# Patient Record
Sex: MALE | Race: BLACK OR AFRICAN AMERICAN | NOT HISPANIC OR LATINO | URBAN - METROPOLITAN AREA
[De-identification: names, ages, dates, MRNs, and addresses within clinical notes are randomized per-mention and may not be internally consistent; named-entity substitution may affect disease eponyms.]

---

## 2023-10-04 ENCOUNTER — TELEPHONE (OUTPATIENT)
Dept: TRANSPLANT | Facility: HOSPITAL | Age: 48
End: 2023-10-04

## 2023-10-10 ENCOUNTER — DOCUMENTATION (OUTPATIENT)
Dept: TRANSPLANT | Facility: HOSPITAL | Age: 48
End: 2023-10-10

## 2023-10-10 NOTE — PROGRESS NOTES
Per prior note:  Patient has completed CT AP (in [facility]) and completed echo (in [facility]). CT AP will need to be reviewed to determine vessel suitability. Noted in HIE he did not complete stress test due to claustrophobia. Will bring in for updates with provider and SW -- will need to determine cardiac work up plan (stress, CT CS). Patient needs scope, referral placed. Attempted to reach patient, but his VM box was not set up.    Plan will be to call pt back to discuss.

## 2023-12-28 ENCOUNTER — DOCUMENTATION (OUTPATIENT)
Dept: TRANSPLANT | Facility: HOSPITAL | Age: 48
End: 2023-12-28
Payer: COMMERCIAL

## 2023-12-28 NOTE — PROGRESS NOTES
Attempted to call patient with phone number is [facility].  Number out of service.  Spoke with his dialysis unit who gave me another number/.  This number was also inactive.  Will send a no contact letter to patient.

## 2024-01-26 ENCOUNTER — DOCUMENTATION (OUTPATIENT)
Dept: TRANSPLANT | Facility: HOSPITAL | Age: 49
End: 2024-01-26
Payer: COMMERCIAL

## 2024-01-26 DIAGNOSIS — Z01.818 PRE-TRANSPLANT EVALUATION FOR KIDNEY TRANSPLANT: ICD-10-CM

## 2024-01-26 NOTE — PROGRESS NOTES
Spoke with patient.  Patient is currently in evaluation for kidney transplant and needs to come in for updates.  Patient verbalized understanding.  Echo, CT Cardiac Score, Labs, Surgery, and SW tasked out to MARYANNE Sandy to schedule.

## 2024-01-31 ENCOUNTER — TELEPHONE (OUTPATIENT)
Dept: TRANSPLANT | Facility: HOSPITAL | Age: 49
End: 2024-01-31
Payer: COMMERCIAL

## 2024-02-02 ENCOUNTER — OTHER (OUTPATIENT)
Dept: TRANSPLANT | Facility: HOSPITAL | Age: 49
End: 2024-02-02
Payer: COMMERCIAL

## 2024-02-02 ENCOUNTER — DOCUMENTATION (OUTPATIENT)
Dept: TRANSPLANT | Facility: HOSPITAL | Age: 49
End: 2024-02-02
Payer: COMMERCIAL

## 2024-02-02 NOTE — PROGRESS NOTES
Spoke to patient today via the phone re his [employer] benefits.  Patient is aware he has benefits for a LD.  Discussed importance of maintaining his [employer] benefits.  Not sure if patient is entitled to Medicare.     Pt with improved mentation from previous assessment. Continues to present with an oral and suspected pharyngeal dysphagia superimposed upon a baseline cough and characterized by delayed oral transit time, delay in trigger of the pharyngeal swallow, repeat swallows post intake (up to 3xs) suggestive of pharyngeal residue and cough post PO intake of most conservative textures. Baseline cough makes subjective assessment difficult to a degree. Hoarse vocal quality.

## 2024-02-14 ENCOUNTER — OFFICE VISIT (OUTPATIENT)
Dept: TRANSPLANT | Facility: HOSPITAL | Age: 49
End: 2024-02-14
Payer: COMMERCIAL

## 2024-02-14 ENCOUNTER — DOCUMENTATION (OUTPATIENT)
Dept: TRANSPLANT | Facility: HOSPITAL | Age: 49
End: 2024-02-14
Payer: COMMERCIAL

## 2024-02-14 ENCOUNTER — SOCIAL WORK (OUTPATIENT)
Dept: TRANSPLANT | Facility: HOSPITAL | Age: 49
End: 2024-02-14
Payer: COMMERCIAL

## 2024-02-14 VITALS
BODY MASS INDEX: 27.86 KG/M2 | TEMPERATURE: 98.9 F | OXYGEN SATURATION: 96 % | SYSTOLIC BLOOD PRESSURE: 107 MMHG | WEIGHT: 217 LBS | RESPIRATION RATE: 17 BRPM | DIASTOLIC BLOOD PRESSURE: 67 MMHG | HEART RATE: 79 BPM

## 2024-02-14 DIAGNOSIS — Z01.818 PRE-TRANSPLANT EVALUATION FOR KIDNEY TRANSPLANT: Primary | ICD-10-CM

## 2024-02-14 PROCEDURE — 99214 OFFICE O/P EST MOD 30 MIN: CPT

## 2024-02-14 ASSESSMENT — PATIENT HEALTH QUESTIONNAIRE - PHQ9
SUM OF ALL RESPONSES TO PHQ9 QUESTIONS 1 & 2: 2
3. TROUBLE FALLING OR STAYING ASLEEP OR SLEEPING TOO MUCH: SEVERAL DAYS
7. TROUBLE CONCENTRATING ON THINGS, SUCH AS READING THE NEWSPAPER OR WATCHING TELEVISION: SEVERAL DAYS
1. LITTLE INTEREST OR PLEASURE IN DOING THINGS: SEVERAL DAYS
SUM OF ALL RESPONSES TO PHQ QUESTIONS 1-9: 8
9. THOUGHTS THAT YOU WOULD BE BETTER OFF DEAD, OR OF HURTING YOURSELF: NOT AT ALL
8. MOVING OR SPEAKING SO SLOWLY THAT OTHER PEOPLE COULD HAVE NOTICED. OR THE OPPOSITE, BEING SO FIGETY OR RESTLESS THAT YOU HAVE BEEN MOVING AROUND A LOT MORE THAN USUAL: MORE THAN HALF THE DAYS
10. IF YOU CHECKED OFF ANY PROBLEMS, HOW DIFFICULT HAVE THESE PROBLEMS MADE IT FOR YOU TO DO YOUR WORK, TAKE CARE OF THINGS AT HOME, OR GET ALONG WITH OTHER PEOPLE: NOT DIFFICULT AT ALL
4. FEELING TIRED OR HAVING LITTLE ENERGY: SEVERAL DAYS
5. POOR APPETITE OR OVEREATING: SEVERAL DAYS
6. FEELING BAD ABOUT YOURSELF - OR THAT YOU ARE A FAILURE OR HAVE LET YOURSELF OR YOUR FAMILY DOWN: NOT AT ALL
2. FEELING DOWN, DEPRESSED OR HOPELESS: SEVERAL DAYS

## 2024-02-14 ASSESSMENT — ANXIETY QUESTIONNAIRES
7. FEELING AFRAID AS IF SOMETHING AWFUL MIGHT HAPPEN: NOT AT ALL
5. BEING SO RESTLESS THAT IT IS HARD TO SIT STILL: SEVERAL DAYS
IF YOU CHECKED OFF ANY PROBLEMS ON THIS QUESTIONNAIRE, HOW DIFFICULT HAVE THESE PROBLEMS MADE IT FOR YOU TO DO YOUR WORK, TAKE CARE OF THINGS AT HOME, OR GET ALONG WITH OTHER PEOPLE: NOT DIFFICULT AT ALL
1. FEELING NERVOUS, ANXIOUS, OR ON EDGE: SEVERAL DAYS
4. TROUBLE RELAXING: SEVERAL DAYS
3. WORRYING TOO MUCH ABOUT DIFFERENT THINGS: SEVERAL DAYS
GAD7 TOTAL SCORE: 6
6. BECOMING EASILY ANNOYED OR IRRITABLE: SEVERAL DAYS
2. NOT BEING ABLE TO STOP OR CONTROL WORRYING: SEVERAL DAYS

## 2024-02-14 NOTE — PROGRESS NOTES
"SW met with Pt and Pt's daughter, Alexsandra for psychosocial update. Pt was pleasant and engaged. Pt reported his insurance is [employer]. Pt denied any recent hospitalizations. Pt reported good compliance with dialysis. Pt shared he goes [facility] for dialysis and runs on ,, for 3 hours and 45 minutes. Pt denied missing or shortening any treatments. Pt reported good compliance with his medications and binders. Pt shared that he takes 5 medications in the morning and 3 medications at night and takes 3 binders before he eats meals. Pt reported that he has been following a renal diet as well. Pt listed his daughter, Alexsandra ([phone]) as primary support. Alexsandra shared that she is working on getting her license and does not drive. Alexsandra shared she is pregnant and due in March and has two other young children. Alexsandra reported that her mom will be able to help care for  and her two young children. Pt listed his friend, Pina ([phone]) as secondary support. Pt reported she can take time off from work and drives and has a working vehicle. SW discussed Pt listing an alternate support since his primary does not drive. Pt listed his daughter, Lauryn ([phone]) as an alternate support. Pt reported that Lauryn works full-time and can take time off and drives and has a working vehicle. Pt shared that Lauryn is pregnant and due in March as well. Pt shared that Lauryn's mom and step-dad can care for her  if needed. Pt listed his daughter, Dwight ([phone]) as an alternate support as well. Pt reported that she works from home and drives and has a working vehicle. Pt shared that she has 2 children under the age of 18 and her mom and step-dad could care for them if needed.     Pt reported his mood as \"not too good.\" Pt denied engaging in any current counseling. Pt denied taking any current medications for MH. Pt shared he stopped going to the [facility] for therapy because he would see " "a different therapist every time and he did not feel like he could open up to new providers every time. Pt reported that his mood has not been good since he quit smoking weed a week ago. Pt reported he has been experiencing mood swings, but otherwise he is \"cool.\" Pt denied any recent feelings of SI or any recent SA attempts. SW discussed the need for Pt to establish and engage in ongoing therapy services twice a month for 6 months. Pt expressed understanding and declined resources. Pt reported he will go to \"Dr. Farrell in [place]\" and could not recall the agency he worked for. SW discussed Pt signing a release for SW to speak with therapist once services are established. Pt was agreeable. SW discussed following-up with Pt in 3 months to monitor his engagement in services. Pt expressed understanding. Pt scored an 8 on the PHQ-9, indicating mild clinical depression. Pt scored a 6 on the BEV-7, indicating mild clinical anxiety. Pt reported that he smokes 3 black and milds daily. Pt reported he is planning to quit and SW offered smoking cessation referral. Pt declined referral and shared he attended \"a few\" of the classes before. Pt denied any current alcohol use. Pt reported drinking was the cause of his kidney disease and he denied any recent cravings or the desire to drink. Pt reported he last drank \"maybe\" on New Years, having a sip of champagne. Pt denied any current illicit drug use. Pt reported he stopped smoking marijuana 1 week ago. Pt is currently moderate to high psychosocial risk. Pt's current risk factor's include Pt's dialysis records needing reviewed, Pt's support system being unconfirmed, Pt's current symptoms of MH, Pt's past hospitalizations for MH, Pt in early remission from stopping his marijuana use, and Pt's active tobacco use.     Plan: SW to follow-up with Pt in 3 months to monitor Pt's engagement in therapeutic services, Pt's MH, and Pt's marijuana and tobacco use. SW to call and confirm Pt's " secondary and alternate supports. Pt to engage in random drug screens. SW requested Pt's dialysis records for review.

## 2024-02-14 NOTE — PROGRESS NOTES
Chief Complaint: Patient presents for kidney transplant evaluation    History of Present Illness:  Salvador Hinton  is a 48 y.o. male presents with ESRD from type 2 diabetes and with ESRD due to diabetes. He has been on dialysis for the past 7 years. Also he has a history of depression.      He has no complaints, states he has been doing well with dialysis. He does have some peripheral edema and fluid retention reported. He denies any chest pain, shortness of breath, or any previous strokes. He also denies any previous abdominal surgeries. He said he is cutting down on his smoking.    Sounds like he had cardioversion for possible A-fib at [facility] this past year? Was on Eliquis at some point 2/2 blood being 'too thick'. No longer on Eliquis. No endorsed hx blood clots.     Initially followed with transplant psych earlier this past year for history of anxiety and depression.     Hemodialysis: HD T,TH,Sat via LUE aVF   ROS: oliguric, no urinary retention/hematuria/recurrent UTIs. One prior kidney stone  Disease Etiology:  diabetic nephropathy and hypertensive nephropathy.   Disease Complications:  congestive heart failure, dyslipidemia and hypertension.      CT Cardiac Score 01/2024: 1339    Past Medical History:  DM  HTN  Retinopathy/neuropathy  Asthma   Distant GWS right LE  Anxiety / Depression    Past Surgical History:  LUE AVF    Review of Systems:  Cardiac: Denies chest pain, palpitations  : Normal urine output. Denies history of gross hematuria, nephrolithiasis, urinary retention, or recurrent UTIs.  Vascular: Denies personal or familial history of DVT/PE. No active claudication or non-healing LE wounds.  Functional Status: Can walk up 2 flights of stairs     Prior transplant: Not applicable    Physical Exam:  Gen: A+OX3; NAD  HEENT: PERRL, sclera anicteric, MMM  Cardiac: RRR  Chest: Normal inspiratory effort  Abdomen: S/NT/ND.  Ext: No LE edema  Vascular: 2+ palpable femoral  pulses  Psychiatric: Normal mood, affect    Assessment/Plan:  - The patient is a good candidate for kidney transplantation.  - Cardiology eval (elevated ca score and possible hx cardioversion). Need [facility] records.   - PFT for smoking hx.    - CT ab pelvis reviewed : external iliacs with adequate landing zones   -Social / Psych clearance     Transplant Education:    I had a discussion with this patient regarding 1 year graft and patient survival statistics following renal transplantation for both living and  donor allograft recipients. This data included [facility] data compared to National data readily available for review on https://www.SRTR.org. The patient also had attended the kidney transplant education class provided by the transplant institute.     The difference between allograft function was discussed comparing living donor, KDPI 0-85%, and >85% kidneys.     Further discussion included:  -The transplant selection committee process.  -The need for lifelong immunosuppressive therapy, and the side effects of these medications including the risk of infections, cancer, and lymphoma.  -The wait list time approximately is 5 years or more for  donor transplants and the statistical superiority of a living donor.     -Using identified donors with risk criteria for transmission of infection  -The possibility of utilizing  donors with known HCV antibody and/or SENIA positivity and post-transplant treatment/surveillance protocol  -Potential transmission of infectious disease from any  donors, as well as living donors.   -The possibility of transmission of tumors and infections via the transplanted organ.  -The inability to completely test for all potential harmful tumors or infectious agents.  -The possibility of listing at multiple locations.     Surgical complications including need for reoperation(s) including but not limited to:  -Bleeding.  -Repair of leaks.  -Control of  infection.  -Blood clots in the transplant vessels.  -Possible kidney transplant removal.     The medical complications including but not limited to:  -Death.  -Cardiac.  -Pulmonary.  -Infectious.  -Neurologic.  -Other Complications.     We also discussed how the kidney transplant could function:  -Non-function and possible kidney transplant removal within the first 3 to 6 months.  -Delayed graft function (dialysis needed after transplant).  -The potential of recurrence of kidney disease leading to kidney transplant graft loss.    Time Attestation:  I spent 60 minutes with the patient, over 50 minutes in counseling and education as outlined above.

## 2024-02-14 NOTE — PROGRESS NOTES
LISTING EDUCATION    Patient educated regarding the following prior to placement on the transplant waiting list:   The patient?s medical condition, prognosis, and treatment plan.   The expectations and patient responsibilities while on the waiting list, including:   Keeping the transplant center informed of any changes in contact information or insurance coverage   Notifying the transplant center of any changes in medical status   Required testing and/or re-evaluation appointments while awaiting transplant   An overview of the surgical procedure, including potential risks and alternatives.   Information regarding what to expect during the inpatient admission and recovery period.   A discussion regarding organ offers and types of potential donors, including potential risks that may be associated with specific types of donors that could affect the success of the transplant or the health of the patient.  The right to refuse transplantation.     Patient was given the opportunity to have questions answered. Patient was provided a copy of the informed consent for transplant listing.    Education provided by:  Transplant Coordinator: Sally Dunham RN  Transplant Physician: Kiki Hansen MD    Signed listing informed consent received? YES  Patient agrees to be listed for the following:  KDPI > 85% [X]  Donors After Circulatory Death (DCD) [X]  Donors with a Positive Core Antibody for Hepatitis B - if immune [X]  Donors with Hepatitis C Virus to recipients with hepatitis C []  Donors with Hepatitis C Virus to Negative hepatitis C recipients [X]    Patient will be discussed at an upcoming selection committee to determine eligibility to be placed on the [facility] waiting list.

## 2024-02-14 NOTE — PROGRESS NOTES
Patient attended appointment on 02/14/2024 with Dr. Hansen.  Female support present. Medications - pt will email me with an updated list.  Confirmed no drug allergies. Patient ambulated. Patient is tolerating dialysis well. Health screenings reviewed - need to get colonoscopy record.   Listing consent completed with Dr. Hansen.  Recent hospitalizations:  No  Blood transfusions:  No.  Falls:  No.    Comments:  Pt will need the following testing ordered and scheduled; this was reviewed with him.    Per SW - pt needs to stop smoking.  Pt also is establishing with a counselor and they are monitoring that, likely it will be at least three months  before this will be resolved/stable.    -Cancel and reschedule the CT cardiac score with anesthesia  -Cancel and reschedule cardiology consult    -Walk-in chest x-ray  -PFTs d/t long hx of smoking  -Updated labs  -Cardiology clearance  -Cardiology records from [place], 2023  -Colonoscopy records from [place], 2023  **Send pt dental clearance letter and have him work on this**

## 2024-02-16 ENCOUNTER — DOCUMENTATION (OUTPATIENT)
Dept: TRANSPLANT | Facility: HOSPITAL | Age: 49
End: 2024-02-16
Payer: COMMERCIAL

## 2024-02-16 ENCOUNTER — HOSPITAL ENCOUNTER (OUTPATIENT)
Dept: RADIOLOGY | Facility: HOSPITAL | Age: 49
Discharge: HOME | End: 2024-02-16
Payer: COMMERCIAL

## 2024-02-16 ENCOUNTER — APPOINTMENT (OUTPATIENT)
Dept: RADIOLOGY | Facility: HOSPITAL | Age: 49
End: 2024-02-16
Payer: COMMERCIAL

## 2024-02-16 DIAGNOSIS — Z01.818 PRE-TRANSPLANT EVALUATION FOR KIDNEY TRANSPLANT: ICD-10-CM

## 2024-02-16 PROCEDURE — 75571 CT HRT W/O DYE W/CA TEST: CPT

## 2024-02-16 NOTE — PROGRESS NOTES
"SW attempted to reach Pt's secondary support, Pina via telephone call to confirm commitment. CHANCE was unable to speak with Pt's secondary support and left VM with SW contact information.    CHANCE spoke with Pt's alternate support, Lauryn via telephone call. CHANCE confirmed identity. Lauryn shared that she works, but is pregnant and will be on maternity leave for 12 weeks. SW inquired if she will be able to take time off after maternity leave if needed, and Lauryn shared that she will \"not be going back like she used to.\" Lauryn reported that she has a 7 year old and will soon have a  who her mom could watch when needed. Lauryn shared she drives and has a working vehicle and lives local to Pt. Lauryn confirmed that she will be a support for Pt during the transplant process. Lauryn denied any further questions/concerns at this time.     CHANCE spoke with Pt's alternate support, Dwight via telephone call. CHANCE confirmed identity. Dwight reported that she works from home full-time, but only during tax season. Dwight reported that she does not work outside of tax season, but if the transplant occurred during the time she works she would be able to take time off. Dwight denied any limitations that would prevent her from caring for Pt. Vikki shared that she has 2 children (9,4) who her grandma could care for when needed. Dwight reported she lives local to Pt and drives and has a working vehicle. Vikki confirmed that she will be a support for Pt during the transplant process. Careynahum denied any further questions/concerns at this time.     Plan: CHANCE will await return phone call from Pt's secondary support. SW to follow original plan from 2024.     "

## 2024-02-21 ENCOUNTER — HOSPITAL ENCOUNTER (OUTPATIENT)
Dept: CARDIOLOGY | Facility: HOSPITAL | Age: 49
Discharge: HOME | End: 2024-02-21
Payer: COMMERCIAL

## 2024-02-21 DIAGNOSIS — Z01.818 PRE-TRANSPLANT EVALUATION FOR KIDNEY TRANSPLANT: ICD-10-CM

## 2024-02-21 LAB
AORTIC VALVE PEAK VELOCITY: 1.53 M/S
AV PEAK GRADIENT: 9.4 MMHG
AVA (PEAK VEL): 3.48 CM2
EJECTION FRACTION APICAL 4 CHAMBER: 54.3
EJECTION FRACTION: 51 %
LEFT ATRIUM VOLUME AREA LENGTH INDEX BSA: 44.4 ML/M2
LEFT VENTRICLE INTERNAL DIMENSION DIASTOLE: 5.5 CM (ref 3.5–6)
LEFT VENTRICULAR OUTFLOW TRACT DIAMETER: 2.3 CM
MITRAL VALVE E/A RATIO: 0.8
MITRAL VALVE E/E' RATIO: 9.15
RIGHT VENTRICLE FREE WALL PEAK S': 13.8 CM/S
TRICUSPID ANNULAR PLANE SYSTOLIC EXCURSION: 1.9 CM

## 2024-02-21 PROCEDURE — 93306 TTE W/DOPPLER COMPLETE: CPT

## 2024-02-21 PROCEDURE — 93306 TTE W/DOPPLER COMPLETE: CPT | Performed by: INTERNAL MEDICINE

## 2024-05-07 ENCOUNTER — TELEPHONE (OUTPATIENT)
Dept: TRANSPLANT | Facility: HOSPITAL | Age: 49
End: 2024-05-07
Payer: COMMERCIAL

## 2024-05-07 DIAGNOSIS — Z01.818 PRE-TRANSPLANT EVALUATION FOR KIDNEY TRANSPLANT: ICD-10-CM

## 2024-05-07 NOTE — TELEPHONE ENCOUNTER
LM for pt req a call back.    Pt needs the following:    -Dental clearance.    -Per review of CAC, pt will likely need a CTA.      Pt also needs the following per my last note:    -Walk-in chest x-ray  -PFTs d/t long hx of smoking  -Updated labs  -Cardiology clearance    -Cardiology records from [place], 2023  -Colonoscopy records from [place], 2023    Tasked out to MARYANNE Sandy.  Orders entered.

## 2024-05-14 ENCOUNTER — TELEPHONE (OUTPATIENT)
Dept: TRANSPLANT | Facility: HOSPITAL | Age: 49
End: 2024-05-14
Payer: COMMERCIAL

## 2024-05-14 NOTE — TELEPHONE ENCOUNTER
Pt called.    Would like a referral to pulmonary medicine here, he is having a hard time getting in to see his lung doctor.    Referral placed and let our  know.    He also notified me he saw a dentist - [facility] in [place].  I faxed a dental clearance request to the office.

## 2024-05-21 ENCOUNTER — APPOINTMENT (OUTPATIENT)
Dept: RESPIRATORY THERAPY | Facility: HOSPITAL | Age: 49
End: 2024-05-21
Payer: COMMERCIAL

## 2024-05-21 ENCOUNTER — TELEPHONE (OUTPATIENT)
Dept: TRANSPLANT | Facility: HOSPITAL | Age: 49
End: 2024-05-21
Payer: COMMERCIAL

## 2024-05-23 ENCOUNTER — DOCUMENTATION (OUTPATIENT)
Dept: TRANSPLANT | Facility: HOSPITAL | Age: 49
End: 2024-05-23
Payer: COMMERCIAL

## 2024-05-23 NOTE — PROGRESS NOTES
Records are in [facility].    Pt's colonoscopy records unable to be located; will call pt to clarify where this was done.

## 2024-05-24 ENCOUNTER — APPOINTMENT (OUTPATIENT)
Dept: RESPIRATORY THERAPY | Facility: HOSPITAL | Age: 49
End: 2024-05-24
Payer: COMMERCIAL

## 2024-05-29 ENCOUNTER — LAB (OUTPATIENT)
Dept: LAB | Facility: LAB | Age: 49
End: 2024-05-29
Payer: COMMERCIAL

## 2024-05-29 ENCOUNTER — HOSPITAL ENCOUNTER (OUTPATIENT)
Dept: RESPIRATORY THERAPY | Facility: HOSPITAL | Age: 49
Discharge: HOME | End: 2024-05-29
Payer: COMMERCIAL

## 2024-05-29 DIAGNOSIS — Z01.818 PRE-TRANSPLANT EVALUATION FOR KIDNEY TRANSPLANT: ICD-10-CM

## 2024-05-29 LAB
ABO GROUP (TYPE) IN BLOOD: NORMAL
ALBUMIN SERPL BCP-MCNC: 4 G/DL (ref 3.4–5)
ALP SERPL-CCNC: 59 U/L (ref 33–120)
ALT SERPL W P-5'-P-CCNC: 6 U/L (ref 10–52)
AMYLASE SERPL-CCNC: 52 U/L (ref 29–103)
APPEARANCE UR: CLEAR
AST SERPL W P-5'-P-CCNC: 9 U/L (ref 9–39)
BACTERIA #/AREA URNS AUTO: ABNORMAL /HPF
BILIRUB DIRECT SERPL-MCNC: 0.1 MG/DL (ref 0–0.3)
BILIRUB SERPL-MCNC: 0.4 MG/DL (ref 0–1.2)
BILIRUB UR STRIP.AUTO-MCNC: NEGATIVE MG/DL
BUN SERPL-MCNC: 75 MG/DL (ref 6–23)
C PEPTIDE SERPL-MCNC: 10.8 NG/ML (ref 0.7–3.9)
CHOLEST SERPL-MCNC: 188 MG/DL (ref 0–199)
CHOLESTEROL/HDL RATIO: 4.5
CMV IGG AVIDITY SERPL IA-RTO: REACTIVE %
COLOR UR: YELLOW
CREAT SERPL-MCNC: 14.23 MG/DL (ref 0.5–1.3)
EBV EA IGG SER QL: NEGATIVE
EBV NA AB SER QL: POSITIVE
EBV VCA IGG SER IA-ACNC: POSITIVE
EBV VCA IGM SER IA-ACNC: NEGATIVE
EGFRCR SERPLBLD CKD-EPI 2021: 4 ML/MIN/1.73M*2
ERYTHROCYTE [DISTWIDTH] IN BLOOD BY AUTOMATED COUNT: 13.7 % (ref 11.5–14.5)
EST. AVERAGE GLUCOSE BLD GHB EST-MCNC: 223 MG/DL
GLUCOSE UR STRIP.AUTO-MCNC: ABNORMAL MG/DL
HBA1C MFR BLD: 9.4 %
HBV CORE AB SER QL: NONREACTIVE
HBV SURFACE AB SER-ACNC: 440.7 MIU/ML
HBV SURFACE AG SERPL QL IA: NONREACTIVE
HCT VFR BLD AUTO: 34.3 % (ref 41–52)
HCV AB SER QL: NONREACTIVE
HCYS SERPL-SCNC: 36.36 UMOL/L (ref 5–13.9)
HDLC SERPL-MCNC: 41.7 MG/DL
HGB BLD-MCNC: 11.6 G/DL (ref 13.5–17.5)
HIV 1+2 AB+HIV1 P24 AG SERPL QL IA: NONREACTIVE
INR PPP: 0.9 (ref 0.9–1.1)
KETONES UR STRIP.AUTO-MCNC: NEGATIVE MG/DL
LEUKOCYTE ESTERASE UR QL STRIP.AUTO: ABNORMAL
MCH RBC QN AUTO: 34.1 PG (ref 26–34)
MCHC RBC AUTO-ENTMCNC: 33.8 G/DL (ref 32–36)
MCV RBC AUTO: 101 FL (ref 80–100)
MGC ASCENT PFT - FEV1 - PRE: 3.53
MGC ASCENT PFT - FEV1 - PREDICTED: 4.01
MGC ASCENT PFT - FVC - PRE: 4.97
MGC ASCENT PFT - FVC - PREDICTED: 5.05
NITRITE UR QL STRIP.AUTO: NEGATIVE
NON-HDL CHOLESTEROL: 146 MG/DL (ref 0–149)
NRBC BLD-RTO: 0 /100 WBCS (ref 0–0)
PH UR STRIP.AUTO: 6.5 [PH]
PHOSPHATE SERPL-MCNC: 7 MG/DL (ref 2.5–4.9)
PLATELET # BLD AUTO: 205 X10*3/UL (ref 150–450)
PROT SERPL-MCNC: 7.2 G/DL (ref 6.4–8.2)
PROT UR STRIP.AUTO-MCNC: ABNORMAL MG/DL
PROTHROMBIN TIME: 10.5 SECONDS (ref 9.8–12.8)
RBC # BLD AUTO: 3.4 X10*6/UL (ref 4.5–5.9)
RBC # UR STRIP.AUTO: ABNORMAL /UL
RBC #/AREA URNS AUTO: ABNORMAL /HPF
RH FACTOR (ANTIGEN D): NORMAL
SP GR UR STRIP.AUTO: 1.01
TREPONEMA PALLIDUM IGG+IGM AB [PRESENCE] IN SERUM OR PLASMA BY IMMUNOASSAY: NONREACTIVE
UROBILINOGEN UR STRIP.AUTO-MCNC: ABNORMAL MG/DL
VARICELLA ZOSTER IGG INDEX: 5.5 IA
VZV IGG SER QL IA: POSITIVE
WBC # BLD AUTO: 8.6 X10*3/UL (ref 4.4–11.3)
WBC #/AREA URNS AUTO: ABNORMAL /HPF

## 2024-05-29 PROCEDURE — 80307 DRUG TEST PRSMV CHEM ANLYZR: CPT

## 2024-05-29 PROCEDURE — 86901 BLOOD TYPING SEROLOGIC RH(D): CPT

## 2024-05-29 PROCEDURE — 82150 ASSAY OF AMYLASE: CPT

## 2024-05-29 PROCEDURE — 80323 ALKALOIDS NOS: CPT

## 2024-05-29 PROCEDURE — 84100 ASSAY OF PHOSPHORUS: CPT

## 2024-05-29 PROCEDURE — 86663 EPSTEIN-BARR ANTIBODY: CPT

## 2024-05-29 PROCEDURE — 86704 HEP B CORE ANTIBODY TOTAL: CPT

## 2024-05-29 PROCEDURE — 86900 BLOOD TYPING SEROLOGIC ABO: CPT

## 2024-05-29 PROCEDURE — 86644 CMV ANTIBODY: CPT

## 2024-05-29 PROCEDURE — 94726 PLETHYSMOGRAPHY LUNG VOLUMES: CPT

## 2024-05-29 PROCEDURE — 86706 HEP B SURFACE ANTIBODY: CPT

## 2024-05-29 PROCEDURE — 87389 HIV-1 AG W/HIV-1&-2 AB AG IA: CPT

## 2024-05-29 PROCEDURE — 86665 EPSTEIN-BARR CAPSID VCA: CPT

## 2024-05-29 PROCEDURE — 80349 CANNABINOIDS NATURAL: CPT

## 2024-05-29 PROCEDURE — 80324 DRUG SCREEN AMPHETAMINES 1/2: CPT

## 2024-05-29 PROCEDURE — 36415 COLL VENOUS BLD VENIPUNCTURE: CPT

## 2024-05-29 PROCEDURE — 85610 PROTHROMBIN TIME: CPT

## 2024-05-29 PROCEDURE — 87086 URINE CULTURE/COLONY COUNT: CPT

## 2024-05-29 PROCEDURE — 84520 ASSAY OF UREA NITROGEN: CPT

## 2024-05-29 PROCEDURE — 84681 ASSAY OF C-PEPTIDE: CPT

## 2024-05-29 PROCEDURE — 87340 HEPATITIS B SURFACE AG IA: CPT

## 2024-05-29 PROCEDURE — 85027 COMPLETE CBC AUTOMATED: CPT

## 2024-05-29 PROCEDURE — 86664 EPSTEIN-BARR NUCLEAR ANTIGEN: CPT

## 2024-05-29 PROCEDURE — 83718 ASSAY OF LIPOPROTEIN: CPT

## 2024-05-29 PROCEDURE — 84154 ASSAY OF PSA FREE: CPT

## 2024-05-29 PROCEDURE — 83090 ASSAY OF HOMOCYSTEINE: CPT

## 2024-05-29 PROCEDURE — 82465 ASSAY BLD/SERUM CHOLESTEROL: CPT

## 2024-05-29 PROCEDURE — 86481 TB AG RESPONSE T-CELL SUSP: CPT

## 2024-05-29 PROCEDURE — 86787 VARICELLA-ZOSTER ANTIBODY: CPT

## 2024-05-29 PROCEDURE — 86803 HEPATITIS C AB TEST: CPT

## 2024-05-29 PROCEDURE — 83036 HEMOGLOBIN GLYCOSYLATED A1C: CPT

## 2024-05-29 PROCEDURE — 80076 HEPATIC FUNCTION PANEL: CPT

## 2024-05-29 PROCEDURE — 84153 ASSAY OF PSA TOTAL: CPT

## 2024-05-29 PROCEDURE — 82565 ASSAY OF CREATININE: CPT

## 2024-05-29 PROCEDURE — 81001 URINALYSIS AUTO W/SCOPE: CPT

## 2024-05-29 PROCEDURE — 86780 TREPONEMA PALLIDUM: CPT

## 2024-05-30 ENCOUNTER — LAB REQUISITION (OUTPATIENT)
Dept: LAB | Facility: CLINIC | Age: 49
End: 2024-05-30
Payer: COMMERCIAL

## 2024-05-30 DIAGNOSIS — N18.6 END STAGE RENAL DISEASE (MULTI): ICD-10-CM

## 2024-05-30 LAB
BACTERIA UR CULT: NORMAL
HOLD SPECIMEN: NORMAL

## 2024-05-31 LAB
AMPHETAMINES SERPL QL SCN: POSITIVE NG/ML
ANNOTATION COMMENT IMP: NORMAL
BARBITURATES SERPL QL SCN: NEGATIVE NG/ML
BENZODIAZ SERPL QL SCN: NEGATIVE NG/ML
BUPRENORPHINE SERPL-MCNC: NEGATIVE NG/ML
CANNABINOIDS SERPL QL SCN: POSITIVE NG/ML
COCAINE SERPL QL SCN: NEGATIVE NG/ML
METHADONE SERPL QL SCN: NEGATIVE NG/ML
METHAMPHET SERPL QL: NEGATIVE NG/ML
NIL(NEG) CONTROL SPOT COUNT: NORMAL
OPIATES SERPL QL SCN: NEGATIVE NG/ML
OXYCODONE SERPL QL: NEGATIVE NG/ML
PANEL A SPOT COUNT: 0
PANEL B SPOT COUNT: 1
PCP SERPL QL SCN: NEGATIVE NG/ML
POS CONTROL SPOT COUNT: NORMAL
PSA FREE MFR SERPL: 40 %
PSA FREE SERPL-MCNC: 0.2 NG/ML
PSA SERPL IA-MCNC: 0.5 NG/ML (ref 0–4)
T-SPOT. TB INTERPRETATION: NEGATIVE

## 2024-06-02 LAB — CANNABINOIDS SERPL-MCNC: 37 NG/ML

## 2024-06-03 ENCOUNTER — TELEPHONE (OUTPATIENT)
Dept: TRANSPLANT | Facility: HOSPITAL | Age: 49
End: 2024-06-03
Payer: COMMERCIAL

## 2024-06-03 LAB
COTININE SERPL-MCNC: 289 NG/ML
HLA CLASS I AB SCREEN,FC: NORMAL
HLA CLASS II AB SCREEN,FC: NORMAL
HLA RESULTS: NORMAL
NICOTINE SERPL-MCNC: 11 NG/ML

## 2024-06-03 NOTE — TELEPHONE ENCOUNTER
Called pt - he will make a dental appt for clearance and let me know when this is done.    He said his colonoscopy was done in [place] and that his dialysis unit SW would likely have that information so I will try to get this.    He said he sees Family Medicine in [place] so I will send the labs with a possible positive UA and they will contact him if he needs tx.    I called Family Medicine, he is not a patient there, I will try his dialysis unit tomorrow.

## 2024-06-05 LAB
AMPHET SERPL-MCNC: <20 NG/ML
MDA SERPL-MCNC: <20 NG/ML
MDEA SERPL-MCNC: <20 NG/ML
MDMA SERPL-MCNC: <20 NG/ML
METHAMPHET SERPL-MCNC: <20 NG/ML

## 2024-07-23 PROBLEM — Z01.818 PREOP EXAMINATION: Status: ACTIVE | Noted: 2024-07-23

## 2024-07-23 RX ORDER — INSULIN GLARGINE 100 [IU]/ML
8 INJECTION, SOLUTION SUBCUTANEOUS
COMMUNITY
Start: 2021-01-25

## 2024-07-23 RX ORDER — HYDRALAZINE HYDROCHLORIDE 50 MG/1
TABLET, FILM COATED ORAL
COMMUNITY
Start: 2018-11-01

## 2024-07-23 RX ORDER — GABAPENTIN 100 MG/1
100 CAPSULE ORAL 3 TIMES DAILY
COMMUNITY

## 2024-07-23 RX ORDER — PANTOPRAZOLE SODIUM 40 MG/1
40 TABLET, DELAYED RELEASE ORAL
COMMUNITY
Start: 2018-11-01

## 2024-07-23 RX ORDER — LIDOCAINE AND PRILOCAINE 25; 25 MG/G; MG/G
CREAM TOPICAL
COMMUNITY
Start: 2024-04-05

## 2024-07-23 RX ORDER — MIRTAZAPINE 15 MG/1
TABLET, FILM COATED ORAL
COMMUNITY
Start: 2024-03-31

## 2024-07-23 RX ORDER — FOLIC ACID 1 MG/1
1 TABLET ORAL
COMMUNITY
Start: 2023-03-10

## 2024-07-23 RX ORDER — SYRING-NEEDL,DISP,INSUL,0.3 ML 29 G X1/2"
SYRINGE, EMPTY DISPOSABLE MISCELLANEOUS
COMMUNITY
Start: 2023-12-28

## 2024-07-23 RX ORDER — CALCIUM ACETATE 667 MG/1
667 CAPSULE ORAL
COMMUNITY
Start: 2018-11-01

## 2024-07-23 RX ORDER — HYOSCYAMINE SULFATE 0.125 MG
TABLET ORAL
COMMUNITY

## 2024-07-23 RX ORDER — ONDANSETRON 4 MG/1
TABLET, ORALLY DISINTEGRATING ORAL
COMMUNITY
Start: 2023-02-16

## 2024-07-23 RX ORDER — FLUOXETINE 10 MG/1
CAPSULE ORAL
COMMUNITY
Start: 2024-05-30

## 2024-07-23 RX ORDER — ISOSORBIDE MONONITRATE 30 MG/1
TABLET, EXTENDED RELEASE ORAL
COMMUNITY
Start: 2022-08-15

## 2024-07-23 RX ORDER — ZOLPIDEM TARTRATE 5 MG/1
TABLET ORAL
COMMUNITY
Start: 2024-06-20

## 2024-07-23 RX ORDER — FLASH GLUCOSE SCANNING READER
EACH MISCELLANEOUS
COMMUNITY
Start: 2023-12-27

## 2024-07-23 RX ORDER — FLASH GLUCOSE SENSOR
KIT MISCELLANEOUS
COMMUNITY
Start: 2023-12-27

## 2024-07-23 RX ORDER — CARVEDILOL 6.25 MG
6.25 TABLET ORAL
COMMUNITY
Start: 2023-03-24

## 2024-07-23 RX ORDER — ASPIRIN 81 MG/1
81 TABLET ORAL
COMMUNITY
Start: 2018-11-01

## 2024-07-23 RX ORDER — ISOPROPYL ALCOHOL 70 ML/100ML
SWAB TOPICAL
COMMUNITY
Start: 2023-12-27

## 2024-07-24 ENCOUNTER — CONSULT (OUTPATIENT)
Dept: CARDIOLOGY | Facility: HOSPITAL | Age: 49
End: 2024-07-24
Payer: COMMERCIAL

## 2024-07-24 VITALS
DIASTOLIC BLOOD PRESSURE: 66 MMHG | HEART RATE: 81 BPM | OXYGEN SATURATION: 96 % | SYSTOLIC BLOOD PRESSURE: 122 MMHG | BODY MASS INDEX: 27.6 KG/M2 | WEIGHT: 215 LBS

## 2024-07-24 DIAGNOSIS — Z01.818 PRE-TRANSPLANT EVALUATION FOR KIDNEY TRANSPLANT: ICD-10-CM

## 2024-07-24 DIAGNOSIS — I25.84 CORONARY ATHEROSCLEROSIS DUE TO SEVERELY CALCIFIED CORONARY LESION: Primary | ICD-10-CM

## 2024-07-24 PROCEDURE — 99215 OFFICE O/P EST HI 40 MIN: CPT | Performed by: STUDENT IN AN ORGANIZED HEALTH CARE EDUCATION/TRAINING PROGRAM

## 2024-07-24 PROCEDURE — 99406 BEHAV CHNG SMOKING 3-10 MIN: CPT | Performed by: STUDENT IN AN ORGANIZED HEALTH CARE EDUCATION/TRAINING PROGRAM

## 2024-07-24 PROCEDURE — 93005 ELECTROCARDIOGRAM TRACING: CPT | Performed by: STUDENT IN AN ORGANIZED HEALTH CARE EDUCATION/TRAINING PROGRAM

## 2024-07-24 PROCEDURE — 99205 OFFICE O/P NEW HI 60 MIN: CPT | Performed by: STUDENT IN AN ORGANIZED HEALTH CARE EDUCATION/TRAINING PROGRAM

## 2024-07-24 ASSESSMENT — PAIN SCALES - GENERAL: PAINLEVEL: 7

## 2024-07-24 ASSESSMENT — ENCOUNTER SYMPTOMS
OCCASIONAL FEELINGS OF UNSTEADINESS: 0
LOSS OF SENSATION IN FEET: 0
DEPRESSION: 0

## 2024-07-24 NOTE — PATIENT INSTRUCTIONS
Dear Salvador Hinton,    It was a pleasure meeting you today at the Cardiology office. As we dicussed, your clinical condition is elevated coronary calcification, high cholesterol, atrial fibrillation, and symptoms of heart failure. I recommended a Left heart catheterization that will be performed by Dr. Remy at our  [facility]. Also, we need to know what medications you are taking to be able to adjust them. You need to reach out to your Cardiologist at [facility] regarding your Afib to restart your anticoagulation as your colonoscopy that was requested came back normal and you will probably only need stool softeners to decrease the risk of bleeding.  Please follow up with me in the Cardiology office once your results are available so we can discuss them.    Sincerely,     Connor Robbins MD

## 2024-07-24 NOTE — PROGRESS NOTES
Location of visit: [facility]   Type of Visit: New    Chief Complaint:  Patient was referred to Cardiology by Abdominal Transplant Team for pretransplant evaluation.    History Of Present Illness:    Salvador Hinton is a 49 y.o. male, with history significant for HTN, HLD, severe coronary calcifications, LVH, Afib status post DCCV without anticoagulation, hyperhomocysteinemia, anemia, active smoking, THC use, prior cocaine use, and ESRD on hemodialysis, who visits Cardiology today as an initial assessment for kidney pre-transplant evaluation. He stopped his Eliquis due to rectal bleeding associated to constipation. He had a colonoscopy done which showed only small polyps.   He reports dyspnea on exertion, shortness of breath, and lower extremity edema before his cardioversion. At this time he is only having dyspnea when he increases the exercise intensity and climb stairs, no associated chest pressure.     Patient denies chest pain, orthopnea, PND, nocturia, palpitations, dizziness, lightheadedness, syncope, or claudication.    Blood pressure today: 122/66 mmHg    Today's ECG shows sinus rhythm at 75 bpm, prolonged AV conduction (226 ms), poor R wave progression on his precordials, and normal ventricular repolarization.    Contrast allergy: no    Calcium Score:  1339 Agatston Units, Date 2/16/2024    Stress test: N/A    Transthoracic echocardiogram 2/21/2024: normal LV function with 55% LVEF, mild LV wall thickening, moderate LVH, no regional wall motion abnormalities, grade 1 diastolic dysfunction, moderate LA enlargement.    Past Medical History:  He has a past medical history of TN, HLD, severe coronary calcifications, LVH, Afib status post DCCV without anticoagulation, hyperhomocysteinemia, active smoking, THC use, prior cocaine use, and ESRD on hemodialysis.    Past Surgical History:  He has no past surgical history on file.    Social History:  He reports that he has been smoking cigarettes and cigars.  "He has never used smokeless tobacco. He reports current drug use. Drug: Marijuana. He reports that he does not drink alcohol.    Family History:  No family history on file.    Allergies:  Patient has no known allergies.    Outpatient Medications:  Current Outpatient Medications   Medication Instructions    alcohol swabs pads, medicated USE TWICE DAILY    apixaban (ELIQUIS) 5 mg, oral    aspirin 81 mg    calcium acetate (PHOSLO) 667 mg    Coreg 6.25 mg    FLUoxetine (PROzac) 10 mg capsule     folic acid (FOLVITE) 1 mg, oral    FreeStyle Ron 2 [place] misc USE DEVICE TO MEASURE BLOOD SUGAR WITH THE SENSOR.    FreeStyle Ron 2 Sensor kit APPLY 1 SENSOR TO BACK OF UPPER ARM EVERY 14 DAYS (ROTATE ARMS)    gabapentin (NEURONTIN) 100 mg, oral, 3 times daily    hydrALAZINE (Apresoline) 50 mg tablet oral    hyoscyamine (Anaspaz, Levsin) 0.125 mg tablet TAKE 1 TO 2 Tablets BY MOUTH EVERY 4 HOURS AS NEEDED FOR IBS SYMPTOMS    isosorbide mononitrate ER (Imdur) 30 mg 24 hr tablet oral    Lantus Solostar U-100 Insulin 8 Units    lidocaine-prilocaine (Emla) 2.5-2.5 % cream APPLY TO TO THE ARM ONE HOUR BEFORE DIALYSIS    linaGLIPtin (TRADJENTA) 5 mg, oral    magnesium citrate solution AS DIRECTED    ondansetron ODT (Zofran-ODT) 4 mg disintegrating tablet oral    Protonix 40 mg    Remeron 15 mg tablet oral    zolpidem (Ambien) 5 mg tablet oral     Last Recorded Vitals:  Vitals:    07/24/24 0955   BP: 122/66   Pulse: 81   SpO2: 96%   Weight: 97.5 kg (215 lb)     Physical Exam:      7/24/2024     9:55 AM 2/14/2024    10:37 AM 1/6/2023    10:43 AM   Vitals   Systolic 122 107 116   Diastolic 66 67 73   Heart Rate 81 79 94   Temp  37.2 °C (98.9 °F) 36.7 °C (98 °F)   Resp  17 18   Height (in)   1.88 m (6' 2\")   Weight (lb) 215 217 241   BMI 27.6 kg/m2 27.86 kg/m2 30.94 kg/m2   BSA (m2) 2.26 m2 2.27 m2 2.39 m2   Visit Report  Report      Wt Readings from Last 5 Encounters:   07/24/24 97.5 kg (215 lb)   02/14/24 98.4 kg (217 lb) "   01/06/23 109 kg (241 lb)     General: Sitting up comfortably in chair; in no apparent distress.  HEENT: Normocephalic; atraumatic. Well hydrated.  Eyes: Anicteric sclera. Extraocular movement intact.  Neck: Supple; no thyromegaly; normal jugular venous pressure, no bruits.  Respiratory: Bilateral air entry equal. No wheezing.  Cardiovascular: Normal S1, S2; no murmurs auscultated.  Abdomen: Nondistended; nontender. (+) bowel sounds.  Extremities: No peripheral edema present. Pulses 2+ diffusely.  Neurological: Oriented to time, place, and person; nonfocal.  Psychiatric: Normal affect.     Last Labs Reviewed:  CBC -  Recent Labs     05/29/24  1121   WBC 8.6   HGB 11.6*   HCT 34.3*      *     CMP -  Recent Labs     05/29/24  1121   BUN 75*   CREATININE 14.23*   EGFR 4*     Recent Labs     05/29/24  1121   ALBUMIN 4.0   ALKPHOS 59   ALT 6*   AST 9   BILITOT 0.4     LIPID PANEL -   Recent Labs     05/29/24  1121   CHOL 188   HDL 41.7     COAGULATION PANEL -  Recent Labs     05/29/24  1121   INR 0.9     HEME/ENDO -  Recent Labs     05/29/24  1121   HGBA1C 9.4*        Last Cardiology/Imaging Tests Personally Reviewed (if images available) and Interpreted:  ECG:  No results found.    Echocardiogram:  Transthoracic Echo (TTE) Complete 02/21/2024  CONCLUSIONS:   1. Left ventricular systolic function is normal with a 55% estimated ejection fraction.   2. Spectral Doppler shows an impaired relaxation pattern of left ventricular diastolic filling.   3. There is moderate concentric left ventricular hypertrophy.   4. The left atrium is moderately dilated.    Cath:  No results found.    Stress Test:  No results found.    Cardiac CT/MRI:  CT cardiac scoring wo IV contrast 02/16/2024  FINDINGS:  The score and distribution of calcium in the coronary arteries is as follows:  LM 28.8,  .1,  LCx 443.1,  .2,  Total 1339.2    The visualized ascending thoracic aorta measures 3.7 cm in diameter. The heart is  normal in size. No pericardial effusion is present.    Other CT:  No results found.    CV RISK FACTORS:   # Hypertension: Last BP: 122/66.  # Hyperlipidemia: Last Tchol 188 / LDL No results found for requested labs within last 365 days. / HDL 41.7 / TRIG No results found for requested labs within last 365 days. (5/29/2024: 11:21 AM).  # Type II Diabetes Mellitus: Last A1c 9.4 (5/29/2024: 11:21 AM).  # Obesity: Last BMI:  .  # CKD: Last BUN/Cr (GFR): 75/14.23 (4), 5/29/2024: 11:21 AM.    ASCV RISK:  The 10-year ASCVD risk score (Pat PEREZ, et al., 2019) is: 8.2%    Values used to calculate the score:      Age: 49 years      Sex: Male      Is Non- : Yes      Diabetic: No      Tobacco smoker: Yes      Systolic Blood Pressure: 122 mmHg      Is BP treated: No      HDL Cholesterol: 41.7 mg/dL      Total Cholesterol: 188 mg/dL    Assessment/Plan   49 y.o. male, with history significant for HTN, HLD, severe coronary calcifications, LVH, Afib status post DCCV without anticoagulation, hyperhomocysteinemia, anemia, active smoking, THC use, prior cocaine use, and ESRD on hemodialysis, who visits Cardiology today as an initial assessment for kidney pre-transplant evaluation. He has very severe elevated calcium score and symptoms of HFpEF, specially when he was on afib. He is not anticoagulated and doesn't have a hard contraindication. He is been treated at [facility] for his cardiac disease and is planning to follow up with them to restart his treatment. He reports HLD treatment but there are no statins on his list. We discussed for 5 minutes about the risks involved in smoking and how important it is to quit. He already stopped using THC. His blood pressure appears well controlled with Cored and AZUL.    #very severe coronary calcifications  #HFpEF  #PAfib without anticoagulation  #HTN  #HLD    Recommendations:  - Diagnostic Left heart catheterization with Dr. Remy at [facility]  - Bring list of medications to  adjust therapy accordingly for HLD  - Follow up with his team at [facility] to restart anticoagulation for his Afib and constipation treatment  - Continue AZUL and Coreg  - Patient will follow up with me in the Cardiology office once the results are available  - I spent 60 minutes assessing the case between pre-charting, face-to-face patient interaction, and documentation    Connor Robbins MD

## 2024-07-24 NOTE — H&P (VIEW-ONLY)
Location of visit: [facility]   Type of Visit: New    Chief Complaint:  Patient was referred to Cardiology by Abdominal Transplant Team for pretransplant evaluation.    History Of Present Illness:    Salvador Hinton is a 49 y.o. male, with history significant for HTN, HLD, severe coronary calcifications, LVH, Afib status post DCCV without anticoagulation, hyperhomocysteinemia, anemia, active smoking, THC use, prior cocaine use, and ESRD on hemodialysis, who visits Cardiology today as an initial assessment for kidney pre-transplant evaluation. He stopped his Eliquis due to rectal bleeding associated to constipation. He had a colonoscopy done which showed only small polyps.   He reports dyspnea on exertion, shortness of breath, and lower extremity edema before his cardioversion. At this time he is only having dyspnea when he increases the exercise intensity and climb stairs, no associated chest pressure.     Patient denies chest pain, orthopnea, PND, nocturia, palpitations, dizziness, lightheadedness, syncope, or claudication.    Blood pressure today: 122/66 mmHg    Today's ECG shows sinus rhythm at 75 bpm, prolonged AV conduction (226 ms), poor R wave progression on his precordials, and normal ventricular repolarization.    Contrast allergy: no    Calcium Score:  1339 Agatston Units, Date 2/16/2024    Stress test: N/A    Transthoracic echocardiogram 2/21/2024: normal LV function with 55% LVEF, mild LV wall thickening, moderate LVH, no regional wall motion abnormalities, grade 1 diastolic dysfunction, moderate LA enlargement.    Past Medical History:  He has a past medical history of TN, HLD, severe coronary calcifications, LVH, Afib status post DCCV without anticoagulation, hyperhomocysteinemia, active smoking, THC use, prior cocaine use, and ESRD on hemodialysis.    Past Surgical History:  He has no past surgical history on file.    Social History:  He reports that he has been smoking cigarettes and cigars.  "He has never used smokeless tobacco. He reports current drug use. Drug: Marijuana. He reports that he does not drink alcohol.    Family History:  No family history on file.    Allergies:  Patient has no known allergies.    Outpatient Medications:  Current Outpatient Medications   Medication Instructions    alcohol swabs pads, medicated USE TWICE DAILY    apixaban (ELIQUIS) 5 mg, oral    aspirin 81 mg    calcium acetate (PHOSLO) 667 mg    Coreg 6.25 mg    FLUoxetine (PROzac) 10 mg capsule     folic acid (FOLVITE) 1 mg, oral    FreeStyle Ron 2 [place] misc USE DEVICE TO MEASURE BLOOD SUGAR WITH THE SENSOR.    FreeStyle Ron 2 Sensor kit APPLY 1 SENSOR TO BACK OF UPPER ARM EVERY 14 DAYS (ROTATE ARMS)    gabapentin (NEURONTIN) 100 mg, oral, 3 times daily    hydrALAZINE (Apresoline) 50 mg tablet oral    hyoscyamine (Anaspaz, Levsin) 0.125 mg tablet TAKE 1 TO 2 Tablets BY MOUTH EVERY 4 HOURS AS NEEDED FOR IBS SYMPTOMS    isosorbide mononitrate ER (Imdur) 30 mg 24 hr tablet oral    Lantus Solostar U-100 Insulin 8 Units    lidocaine-prilocaine (Emla) 2.5-2.5 % cream APPLY TO TO THE ARM ONE HOUR BEFORE DIALYSIS    linaGLIPtin (TRADJENTA) 5 mg, oral    magnesium citrate solution AS DIRECTED    ondansetron ODT (Zofran-ODT) 4 mg disintegrating tablet oral    Protonix 40 mg    Remeron 15 mg tablet oral    zolpidem (Ambien) 5 mg tablet oral     Last Recorded Vitals:  Vitals:    07/24/24 0955   BP: 122/66   Pulse: 81   SpO2: 96%   Weight: 97.5 kg (215 lb)     Physical Exam:      7/24/2024     9:55 AM 2/14/2024    10:37 AM 1/6/2023    10:43 AM   Vitals   Systolic 122 107 116   Diastolic 66 67 73   Heart Rate 81 79 94   Temp  37.2 °C (98.9 °F) 36.7 °C (98 °F)   Resp  17 18   Height (in)   1.88 m (6' 2\")   Weight (lb) 215 217 241   BMI 27.6 kg/m2 27.86 kg/m2 30.94 kg/m2   BSA (m2) 2.26 m2 2.27 m2 2.39 m2   Visit Report  Report      Wt Readings from Last 5 Encounters:   07/24/24 97.5 kg (215 lb)   02/14/24 98.4 kg (217 lb) "   01/06/23 109 kg (241 lb)     General: Sitting up comfortably in chair; in no apparent distress.  HEENT: Normocephalic; atraumatic. Well hydrated.  Eyes: Anicteric sclera. Extraocular movement intact.  Neck: Supple; no thyromegaly; normal jugular venous pressure, no bruits.  Respiratory: Bilateral air entry equal. No wheezing.  Cardiovascular: Normal S1, S2; no murmurs auscultated.  Abdomen: Nondistended; nontender. (+) bowel sounds.  Extremities: No peripheral edema present. Pulses 2+ diffusely.  Neurological: Oriented to time, place, and person; nonfocal.  Psychiatric: Normal affect.     Last Labs Reviewed:  CBC -  Recent Labs     05/29/24  1121   WBC 8.6   HGB 11.6*   HCT 34.3*      *     CMP -  Recent Labs     05/29/24  1121   BUN 75*   CREATININE 14.23*   EGFR 4*     Recent Labs     05/29/24  1121   ALBUMIN 4.0   ALKPHOS 59   ALT 6*   AST 9   BILITOT 0.4     LIPID PANEL -   Recent Labs     05/29/24  1121   CHOL 188   HDL 41.7     COAGULATION PANEL -  Recent Labs     05/29/24  1121   INR 0.9     HEME/ENDO -  Recent Labs     05/29/24  1121   HGBA1C 9.4*        Last Cardiology/Imaging Tests Personally Reviewed (if images available) and Interpreted:  ECG:  No results found.    Echocardiogram:  Transthoracic Echo (TTE) Complete 02/21/2024  CONCLUSIONS:   1. Left ventricular systolic function is normal with a 55% estimated ejection fraction.   2. Spectral Doppler shows an impaired relaxation pattern of left ventricular diastolic filling.   3. There is moderate concentric left ventricular hypertrophy.   4. The left atrium is moderately dilated.    Cath:  No results found.    Stress Test:  No results found.    Cardiac CT/MRI:  CT cardiac scoring wo IV contrast 02/16/2024  FINDINGS:  The score and distribution of calcium in the coronary arteries is as follows:  LM 28.8,  .1,  LCx 443.1,  .2,  Total 1339.2    The visualized ascending thoracic aorta measures 3.7 cm in diameter. The heart is  normal in size. No pericardial effusion is present.    Other CT:  No results found.    CV RISK FACTORS:   # Hypertension: Last BP: 122/66.  # Hyperlipidemia: Last Tchol 188 / LDL No results found for requested labs within last 365 days. / HDL 41.7 / TRIG No results found for requested labs within last 365 days. (5/29/2024: 11:21 AM).  # Type II Diabetes Mellitus: Last A1c 9.4 (5/29/2024: 11:21 AM).  # Obesity: Last BMI:  .  # CKD: Last BUN/Cr (GFR): 75/14.23 (4), 5/29/2024: 11:21 AM.    ASCV RISK:  The 10-year ASCVD risk score (Pat PEREZ, et al., 2019) is: 8.2%    Values used to calculate the score:      Age: 49 years      Sex: Male      Is Non- : Yes      Diabetic: No      Tobacco smoker: Yes      Systolic Blood Pressure: 122 mmHg      Is BP treated: No      HDL Cholesterol: 41.7 mg/dL      Total Cholesterol: 188 mg/dL    Assessment/Plan   49 y.o. male, with history significant for HTN, HLD, severe coronary calcifications, LVH, Afib status post DCCV without anticoagulation, hyperhomocysteinemia, anemia, active smoking, THC use, prior cocaine use, and ESRD on hemodialysis, who visits Cardiology today as an initial assessment for kidney pre-transplant evaluation. He has very severe elevated calcium score and symptoms of HFpEF, specially when he was on afib. He is not anticoagulated and doesn't have a hard contraindication. He is been treated at [facility] for his cardiac disease and is planning to follow up with them to restart his treatment. He reports HLD treatment but there are no statins on his list. We discussed for 5 minutes about the risks involved in smoking and how important it is to quit. He already stopped using THC. His blood pressure appears well controlled with Cored and AZUL.    #very severe coronary calcifications  #HFpEF  #PAfib without anticoagulation  #HTN  #HLD    Recommendations:  - Diagnostic Left heart catheter[place] with [place] [facility]  - Bring list of medications to  adjust therapy accordingly for HLD  - Follow up with his team at [facility] to restart anticoagulation for his Afib and constipation treatment  - Continue AZUL and Coreg  - Patient will follow up with me in the Cardiology office once the results are available  - I spent 60 minutes assessing the case between pre-charting, face-to-face patient interaction, and documentation    Connor Robbins MD

## 2024-07-25 LAB
ATRIAL RATE: 75 BPM
P AXIS: 51 DEGREES
P OFFSET: 163 MS
P ONSET: 96 MS
PR INTERVAL: 226 MS
Q ONSET: 209 MS
QRS COUNT: 12 BEATS
QRS DURATION: 124 MS
QT INTERVAL: 428 MS
QTC CALCULATION(BAZETT): 477 MS
QTC FREDERICIA: 460 MS
R AXIS: 41 DEGREES
T AXIS: 48 DEGREES
T OFFSET: 423 MS
VENTRICULAR RATE: 75 BPM

## 2024-08-02 ENCOUNTER — TELEPHONE (OUTPATIENT)
Dept: CARDIOLOGY | Facility: CLINIC | Age: 49
End: 2024-08-02
Payer: COMMERCIAL

## 2024-08-02 NOTE — TELEPHONE ENCOUNTER
Patients med list includes eliquis. According to office note 7/24/24, patient no longer taking. TCT patient to confirm that he is not currently taking eliquis. No answer and mailbox is full so unable to leave VM with instructions. Patient for heart cath on Monday

## 2024-08-05 ENCOUNTER — HOSPITAL ENCOUNTER (OUTPATIENT)
Facility: HOSPITAL | Age: 49
Setting detail: OUTPATIENT SURGERY
Discharge: HOME | End: 2024-08-05
Attending: HOSPITALIST | Admitting: HOSPITALIST
Payer: COMMERCIAL

## 2024-08-05 VITALS
DIASTOLIC BLOOD PRESSURE: 79 MMHG | OXYGEN SATURATION: 97 % | HEIGHT: 73 IN | HEART RATE: 16 BPM | WEIGHT: 230 LBS | SYSTOLIC BLOOD PRESSURE: 153 MMHG | BODY MASS INDEX: 30.48 KG/M2 | RESPIRATION RATE: 16 BRPM

## 2024-08-05 DIAGNOSIS — I25.84 CORONARY ATHEROSCLEROSIS DUE TO SEVERELY CALCIFIED CORONARY LESION: Primary | ICD-10-CM

## 2024-08-05 DIAGNOSIS — Z01.818 PRE-TRANSPLANT EVALUATION FOR KIDNEY TRANSPLANT: ICD-10-CM

## 2024-08-05 LAB
ANION GAP SERPL CALC-SCNC: 15 MMOL/L (ref 10–20)
BUN SERPL-MCNC: 55 MG/DL (ref 6–23)
CALCIUM SERPL-MCNC: 10.3 MG/DL (ref 8.6–10.6)
CHLORIDE SERPL-SCNC: 101 MMOL/L (ref 98–107)
CO2 SERPL-SCNC: 27 MMOL/L (ref 21–32)
CREAT SERPL-MCNC: 10.28 MG/DL (ref 0.5–1.3)
EGFRCR SERPLBLD CKD-EPI 2021: 6 ML/MIN/1.73M*2
ERYTHROCYTE [DISTWIDTH] IN BLOOD BY AUTOMATED COUNT: 14.5 % (ref 11.5–14.5)
GLUCOSE SERPL-MCNC: 103 MG/DL (ref 74–99)
HCT VFR BLD AUTO: 32.2 % (ref 41–52)
HGB BLD-MCNC: 10.4 G/DL (ref 13.5–17.5)
MCH RBC QN AUTO: 32.8 PG (ref 26–34)
MCHC RBC AUTO-ENTMCNC: 32.3 G/DL (ref 32–36)
MCV RBC AUTO: 102 FL (ref 80–100)
NRBC BLD-RTO: 0 /100 WBCS (ref 0–0)
PLATELET # BLD AUTO: 152 X10*3/UL (ref 150–450)
POTASSIUM SERPL-SCNC: 5.4 MMOL/L (ref 3.5–5.3)
RBC # BLD AUTO: 3.17 X10*6/UL (ref 4.5–5.9)
SODIUM SERPL-SCNC: 138 MMOL/L (ref 136–145)
WBC # BLD AUTO: 8.6 X10*3/UL (ref 4.4–11.3)

## 2024-08-05 PROCEDURE — 93454 CORONARY ARTERY ANGIO S&I: CPT | Performed by: HOSPITALIST

## 2024-08-05 PROCEDURE — 2500000005 HC RX 250 GENERAL PHARMACY W/O HCPCS: Performed by: HOSPITALIST

## 2024-08-05 PROCEDURE — 99152 MOD SED SAME PHYS/QHP 5/>YRS: CPT | Performed by: HOSPITALIST

## 2024-08-05 PROCEDURE — 2720000007 HC OR 272 NO HCPCS: Performed by: HOSPITALIST

## 2024-08-05 PROCEDURE — 2500000001 HC RX 250 WO HCPCS SELF ADMINISTERED DRUGS (ALT 637 FOR MEDICARE OP)

## 2024-08-05 PROCEDURE — 36415 COLL VENOUS BLD VENIPUNCTURE: CPT | Performed by: HOSPITALIST

## 2024-08-05 PROCEDURE — 99153 MOD SED SAME PHYS/QHP EA: CPT | Performed by: HOSPITALIST

## 2024-08-05 PROCEDURE — 2550000001 HC RX 255 CONTRASTS: Performed by: HOSPITALIST

## 2024-08-05 PROCEDURE — 7100000010 HC PHASE TWO TIME - EACH INCREMENTAL 1 MINUTE: Performed by: HOSPITALIST

## 2024-08-05 PROCEDURE — C1894 INTRO/SHEATH, NON-LASER: HCPCS | Performed by: HOSPITALIST

## 2024-08-05 PROCEDURE — 7100000009 HC PHASE TWO TIME - INITIAL BASE CHARGE: Performed by: HOSPITALIST

## 2024-08-05 PROCEDURE — 85027 COMPLETE CBC AUTOMATED: CPT | Performed by: HOSPITALIST

## 2024-08-05 PROCEDURE — 2500000004 HC RX 250 GENERAL PHARMACY W/ HCPCS (ALT 636 FOR OP/ED): Performed by: HOSPITALIST

## 2024-08-05 PROCEDURE — C1760 CLOSURE DEV, VASC: HCPCS | Performed by: HOSPITALIST

## 2024-08-05 PROCEDURE — 80048 BASIC METABOLIC PNL TOTAL CA: CPT | Performed by: HOSPITALIST

## 2024-08-05 RX ORDER — HEPARIN SODIUM 1000 [USP'U]/ML
INJECTION, SOLUTION INTRAVENOUS; SUBCUTANEOUS AS NEEDED
Status: DISCONTINUED | OUTPATIENT
Start: 2024-08-05 | End: 2024-08-05 | Stop reason: HOSPADM

## 2024-08-05 RX ORDER — DIPHENHYDRAMINE HCL 25 MG
25 CAPSULE ORAL ONCE
Status: COMPLETED | OUTPATIENT
Start: 2024-08-05 | End: 2024-08-05

## 2024-08-05 RX ORDER — MIDAZOLAM HYDROCHLORIDE 1 MG/ML
INJECTION, SOLUTION INTRAMUSCULAR; INTRAVENOUS AS NEEDED
Status: DISCONTINUED | OUTPATIENT
Start: 2024-08-05 | End: 2024-08-05 | Stop reason: HOSPADM

## 2024-08-05 RX ORDER — DIPHENHYDRAMINE HCL 25 MG
CAPSULE ORAL
Status: COMPLETED
Start: 2024-08-05 | End: 2024-08-05

## 2024-08-05 RX ORDER — LIDOCAINE HYDROCHLORIDE 10 MG/ML
INJECTION, SOLUTION EPIDURAL; INFILTRATION; INTRACAUDAL; PERINEURAL AS NEEDED
Status: DISCONTINUED | OUTPATIENT
Start: 2024-08-05 | End: 2024-08-05 | Stop reason: HOSPADM

## 2024-08-05 RX ORDER — FENTANYL CITRATE 50 UG/ML
INJECTION, SOLUTION INTRAMUSCULAR; INTRAVENOUS AS NEEDED
Status: DISCONTINUED | OUTPATIENT
Start: 2024-08-05 | End: 2024-08-05 | Stop reason: HOSPADM

## 2024-08-05 RX ORDER — SODIUM CHLORIDE 9 MG/ML
INJECTION, SOLUTION INTRAVENOUS CONTINUOUS PRN
Status: COMPLETED | OUTPATIENT
Start: 2024-08-05 | End: 2024-08-05

## 2024-08-05 RX ORDER — NITROGLYCERIN 5 MG/ML
INJECTION, SOLUTION INTRAVENOUS AS NEEDED
Status: DISCONTINUED | OUTPATIENT
Start: 2024-08-05 | End: 2024-08-05 | Stop reason: HOSPADM

## 2024-08-05 ASSESSMENT — COLUMBIA-SUICIDE SEVERITY RATING SCALE - C-SSRS
2. HAVE YOU ACTUALLY HAD ANY THOUGHTS OF KILLING YOURSELF?: NO
6. HAVE YOU EVER DONE ANYTHING, STARTED TO DO ANYTHING, OR PREPARED TO DO ANYTHING TO END YOUR LIFE?: NO
1. IN THE PAST MONTH, HAVE YOU WISHED YOU WERE DEAD OR WISHED YOU COULD GO TO SLEEP AND NOT WAKE UP?: NO

## 2024-08-05 NOTE — PROGRESS NOTES
Pharmacy Medication History Review    Salvador Hinton is a 49 y.o. male admitted for Coronary atherosclerosis due to severely calcified coronary lesion. Pharmacy reviewed the patient's [id number] medications and allergies for accuracy.    The list below reflects the updated PTA list. Comments regarding how patient may be taking medications differently can be found in the Admit Orders Activity  Prior to Admission Medications   Prescriptions Last Dose Informant   Coreg 6.25 mg tablet  Self   Sig: Take 1 tablet (6.25 mg) by mouth 2 times a day.   FLUoxetine (PROzac) 10 mg capsule  Self   Sig: Take 1 capsule (10 mg) by mouth once daily.   Protonix 40 mg EC tablet  Self   Sig: Take 1 tablet (40 mg) by mouth once daily in the morning. Take before meals.   calcium acetate (Phoslo) 667 mg capsule  Self   Sig: Take 3 capsules (2,001 mg) by mouth 3 times a day.   folic acid (Folvite) 1 mg tablet  Self   Sig: Take 1 tablet (1 mg) by mouth once daily.   gabapentin (Neurontin) 100 mg capsule  Self   Sig: Take 1 capsule (100 mg) by mouth once daily as needed.   hydrALAZINE (Apresoline) 50 mg tablet  Self   Sig: Take 1 tablet (50 mg) by mouth 2 times a day.   hyoscyamine (Anaspaz, Levsin) 0.125 mg tablet  Self   Sig: TAKE 1 TO 2 Tablets BY MOUTH EVERY 4 HOURS AS NEEDED FOR IBS SYMPTOMS   isosorbide mononitrate ER (Imdur) 30 mg 24 hr tablet  Self   Sig: Take 1 tablet (30 mg) by mouth once daily.   lidocaine-prilocaine (Emla) 2.5-2.5 % cream  Self   Sig: APPLY TO TO THE ARM ONE HOUR BEFORE DIALYSIS   linaGLIPtin (Tradjenta) 5 mg tablet  Self   Sig: Take 1 tablet (5 mg) by mouth once daily.   ondansetron ODT (Zofran-ODT) 4 mg disintegrating tablet  Self   Sig: Take 1 tablet (4 mg) by mouth every 8 hours if needed for vomiting or nausea.   zolpidem (Ambien) 5 mg tablet  Self   Sig: Take 1 tablet (5 mg) by mouth as needed at bedtime for sleep.      Facility-Administered Medications: None        The list below reflects the  updated allergy list. Please review each documented allergy for additional clarification and justification.  Allergies  Reviewed by Swathi Escalera RN on 8/5/2024   No Known Allergies         Patient declines M2B at discharge. Pharmacy has been updated to [facility] pharmacy in [place], [place].    Sources:    -patient interview (had handwritten med list)  -outpatient pharmacy dispense history  -Care Everywhere medication lists  -OARRS  -cardio office visit 7/24    Below are additional concerns with the patient's PTA list:    Aspirin 81mg daily- patient is NOT currently taking    Eliquis 5mg every 12 hours-  patient is NOT currently taking (said he has not taken in 8-9 months; no confirmed pharmacy fill history in the last year)    Hydralazine 50mg TID- patient only takes BID due to dizziness    Insulin-  patient states he is not taking any form of insulin (no pharmacy dispenses in the last year- remote records of him being on Lantus at some time    Eric Mata, PharmD  Transitions of Care Pharmacist  [facility]  Please reach out via Secure Chat for questions, or if no response call [email or web address] or [email or web address]

## 2024-08-05 NOTE — POST-PROCEDURE NOTE
Physician Transition of Care Summary  Invasive Cardiovascular Lab    Procedure Date: 8/5/2024  Attending:    Chuck Remy - Primary  Resident/Fellow/Other Assistant: Surgeons and Role:  * No surgeons found with a matching role *    Indications:   Pre-op Diagnosis      * Coronary atherosclerosis due to severely calcified coronary lesion [I25.84]     * Pre-transplant evaluation for kidney transplant [Z01.818]    Post-procedure diagnosis:   Post-op Diagnosis     * Coronary atherosclerosis due to severely calcified coronary lesion [I25.84]     * Pre-transplant evaluation for kidney transplant [Z01.818]    Procedure(s):   Left Heart Cath  15140 - KS CATH PLMT L HRT & ARTS W/NJX & ANGIO IMG S&I    Procedure Findings:   Mild CAD in a right-dominant system.    Access of the Procedure:   6 [place] RRA, closed with TR band.    Complications:   None.    Stents/Implants:   None.    Anticoagulation/Antiplatelet Plan:   Heparin bolus for radial access.    Estimated Blood Loss:   5 mL    Anesthesia: Moderate Sedation Anesthesia Staff: No anesthesia staff entered.    Any Specimen(s) Removed:   Order Name Source Comment Collection Info Order Time   CBC Blood, Venous  Collected By: Gordon Torres RN 8/5/2024  7:50 AM     Release result to [employer]   Immediate        BASIC METABOLIC PANEL Blood, Venous  Collected By: Gordon Torres RN 8/5/2024  7:50 AM     Release result to [employer]   Immediate            Disposition:   Further management as per Dr. Robbins.      Electronically signed by: Ruth Remy MD, 8/5/2024 9:50 AM

## 2024-08-05 NOTE — DISCHARGE INSTRUCTIONS
DISCHARGE INSTRUCTIONS FOR RADIAL (WRIST) ACCESS AFTER HEART CATHETERIZATION    Next 24 hrs.  -DO NOT drive a car, operate machinery or power tools.  It is recommended that a responsible adult be with you for the first 24 hours.   -DO NOT drink any alcoholic drinks or take any non-prescriptive medications that contain alcohol for the first 24 hours.   -DO NOT make any important decisions for the first 24 hours.  -You are advised to go directly home from the hospital    Wound Care:  -You may experience some tenderness, bruising or minimal inflammation.  If you have any concerns, you may contact the Cath Lab or if any of these symptoms become excessive, contact your cardiologist or go to the emergency room.   -No tub baths, soaking, or swimming for one week.   -May shower the next day after your procedure.    Radial site Care:   -DO NOT lift anything with your affected arm for the next 24 hours, then no lifting greater than 5 pounds with your affected arm for 5 more days. No bending or flexing the affected wrist.   -Avoid immersion in water of the affected site for the next 3 days.   -Apply manual pressure and call physician immediately if bleeding or hematoma occurs at the site.   -Remove dressing the next day and keep site clean, dry, and covered with a new band aid daily until healed.   -Report any symptoms other than slight tenderness at the site or tingling of the fingers and hand for up to 3 days.    Diet:   -Heart healthy diet; Low cholesterol, fat and sodium    Other Instructions:  -If you develop difficulty breathing, rash, hives, fever, chills, severe nausea, vomiting, light-headedness or any signs of infection, immediately contact your doctor and go to the nearest emergency room.

## 2024-08-29 ENCOUNTER — DOCUMENTATION (OUTPATIENT)
Dept: CARDIOLOGY | Facility: HOSPITAL | Age: 49
End: 2024-08-29
Payer: COMMERCIAL

## 2024-08-29 NOTE — PROGRESS NOTES
CARDIOLOGY UPDATE    Left heart catheterization performed on 8/5/2024 showed mild CAD at mid LAD, 1st diagonal and OM1.  Given the patient's clinical assessment, there is no contraindication to list for kidney transplant from the cardiac standpoint.     Recommendations:  - HLD treatment with LDL goal <70  - Yearly stress test  - Cardiology follow-up every 2 years until transplanted    Connor Robbins MD

## 2024-09-19 ENCOUNTER — DOCUMENTATION (OUTPATIENT)
Dept: TRANSPLANT | Facility: HOSPITAL | Age: 49
End: 2024-09-19
Payer: COMMERCIAL

## 2024-09-19 NOTE — PROGRESS NOTES
Transplant Candidate Pharmacist Screening Note     Current Outpatient Medications on File Prior to Visit   Medication Sig Dispense Refill    calcium acetate (Phoslo) 667 mg capsule Take 3 capsules (2,001 mg) by mouth 3 times a day.      Coreg 6.25 mg tablet Take 1 tablet (6.25 mg) by mouth 2 times a day.      FLUoxetine (PROzac) 10 mg capsule Take 1 capsule (10 mg) by mouth once daily.      folic acid (Folvite) 1 mg tablet Take 1 tablet (1 mg) by mouth once daily.      gabapentin (Neurontin) 100 mg capsule Take 1 capsule (100 mg) by mouth once daily as needed.      hydrALAZINE (Apresoline) 50 mg tablet Take 1 tablet (50 mg) by mouth 2 times a day.      hyoscyamine (Anaspaz, Levsin) 0.125 mg tablet TAKE 1 TO 2 Tablets BY MOUTH EVERY 4 HOURS AS NEEDED FOR IBS SYMPTOMS      isosorbide mononitrate ER (Imdur) 30 mg 24 hr tablet Take 1 tablet (30 mg) by mouth once daily.      lidocaine-prilocaine (Emla) 2.5-2.5 % cream APPLY TO TO THE ARM ONE HOUR BEFORE DIALYSIS      linaGLIPtin (Tradjenta) 5 mg tablet Take 1 tablet (5 mg) by mouth once daily.      ondansetron ODT (Zofran-ODT) 4 mg disintegrating tablet Take 1 tablet (4 mg) by mouth every 8 hours if needed for vomiting or nausea.      Protonix 40 mg EC tablet Take 1 tablet (40 mg) by mouth once daily in the morning. Take before meals.      zolpidem (Ambien) 5 mg tablet Take 1 tablet (5 mg) by mouth as needed at bedtime for sleep.       No current facility-administered medications on file prior to visit.       The patient's reported medications have been reviewed. Based on the above medication list, there are no pharmacologic contraindications to kidney transplantation.    Ralph Sanchez, PharmD, BCPS, BCTXP   Solid Organ Transplant Clinical Pharmacy Specialist

## 2024-09-23 ENCOUNTER — COMMITTEE REVIEW (OUTPATIENT)
Dept: TRANSPLANT | Facility: HOSPITAL | Age: 49
End: 2024-09-23
Payer: COMMERCIAL

## 2024-09-23 ENCOUNTER — DOCUMENTATION (OUTPATIENT)
Dept: TRANSPLANT | Facility: HOSPITAL | Age: 49
End: 2024-09-23
Payer: COMMERCIAL

## 2024-09-23 DIAGNOSIS — Z01.818 PRE-TRANSPLANT EVALUATION FOR KIDNEY TRANSPLANT: ICD-10-CM

## 2024-09-23 NOTE — COMMITTEE REVIEW
Presentation for Listing Evaluation Date: 1/6/2023  Committee Review Date: 9/19/2024    Organ being evaluated for: Kidney    Transplant Phase: Evaluation  Transplant Status: Active    Referring Physician:      Primary Diagnosis:   Secondary Diagnosis:     Committee Review Decision: Needs Re-presentation      Committee Discussion Details:   The candidate's evaluation was presented and discussed at the Kidney  Multidisciplinary Selection Conference. After review of the candidate's diagnosis and the evaluations of the multidisciplinary team members, it was the consensus of the Selection Committee that the candidate does not meet Kidney Selection Criteria and is Needs Re-presentation for Kidney transplant.    Resolution: Continue evaluation.  Patient to follow up with SW and have tobacco cessation with negative cotinine tests for 6 months.  After 6 months, he needs to see surgeon and SW.

## 2024-09-23 NOTE — PROGRESS NOTES
Called MILLY FLETCHER.    She let me know Salvador is in the hospital right now.    I let her know we were continuing his eval and needed him to quit smoking but that I would call him in a few weeks to check in and go over eval updates for him.

## 2024-10-07 ENCOUNTER — DOCUMENTATION (OUTPATIENT)
Dept: TRANSPLANT | Facility: HOSPITAL | Age: 49
End: 2024-10-07
Payer: COMMERCIAL

## 2024-10-07 NOTE — PROGRESS NOTES
Kidney Patient Summary    Date of appointment: 10/07/2024    Name: Salvador Hinton    : 1975    MRN: [id number]    Diagnosis: Diabetes Mellitus - Type II    ABO: A     Phase: Evaluation  Status: Active    Center Waitlist Date:       Last Seen: 2023  Referring Nephrologist:       [facility] Unit: [facility]   Dialysis Start: 7 years  Access:   LUE AVF    Days:  TTS    PCP: Sommer Barnes MD       Endocrinologist:     BMI Readings from Last 1 Encounters:   24 30.34 kg/m²     Blood Transfusion:    Medical History/Hospitalizations:   Past Medical History:   Diagnosis Date    Preop examination        Surgical History:   Past Surgical History:   Procedure Laterality Date    CARDIAC CATHETERIZATION N/A 2024    Procedure: Left Heart Cath;  Surgeon: Ruth Remy MD;  Location: [place] [address] Cardiac Cath Lab;  Service: Cardiovascular;  Laterality: N/A;     Donors:       Staff:  Nephrologist: Molly   Surgeon: Tyrone    : Navi     Testing Date:     Serologies:    CMV:     Reactive (A; Ref range: Nonreactive)  EBV Panel:  ROSETTA-LYONS VCA IGG:   Positive   ROSETTA-LYONS VCA IGM: Negative   EBV EARLY ANTIGEN ANTIBODY, IGG: Negative   EPSTIEN-LYONS NUCLEAR ANTIGEN AB: Positive   Tox:    AMPHETAMINE SCREEN BLOOD: Positive   METHAMPHETAMINE, BLOOD, SCREEN: Negative   BENZODIAZEPINES SCREEN BLOOD: Negative   BUPRENORPHINE SCREEN BLOOD: Negative   CANNABINOIDS SCREEN BLOOD: Positive   COCAINE SCREEN BLOOD: Negative   METHADONE SCREEN BLOOD: Negative   OXYCODONE SCREEN BLOOD: Negative   PHENCYCLIDINE SCREEN BLOOD: Negative   OPIATE SCREEN BLOOD: Negative   DRUG SCREEN COMMENT BLOOD: See Note   BARBITURATE SCREEN BLOOD: Negative   Cotinine: 11; 289  HBV ag:   Nonreactive  HBV ab:   440.7  HBV c:     Nonreactive  HCV:        Nonreactive  HIV:    Nonreactive  RPR:    Nonreactive  TSpot:   Negative   VZV:   VARICELLA ZOSTER IGG: Positive   VARICELLA ZOSTER IGG INDEX: 5.5   A1C:        HEMOGLOBIN A1C/HEMOGLOBIN TOTAL IN BLOOD: 9.4   ESTIMATED AVERAGE GLUCOSE FOR HBA1C: 223   CPep:  10.8  PSA:    0.5  Other:     Test/Consult Impression Next Scheduled Date   CXR No results found.     EKG ECG 12 lead (Clinic Performed) 7/24/2024    Narrative  Sinus rhythm with 1st degree AV block  Nonspecific intraventricular conduction delay  Borderline ECG  No previous ECGs available  Confirmed by Kei Sharif ([id number]) on 8/12/2024 11:54:00 PM      Echo 2/2024  CONCLUSIONS:   1. Left ventricular systolic function is normal with a 55% estimated ejection fraction.   2. Spectral Doppler shows an impaired relaxation pattern of left ventricular diastolic filling.   3. There is moderate concentric left ventricular hypertrophy.   4. The left atrium is moderately dilated.   5. No prior echocardiogram available for comparison.    CT Cardiac Score CT cardiac scoring wo IV contrast    Result Date: 2/16/2024  1. Coronary artery calcium score of 1339.2. 2. Given patient's extremely coronary artery calcium score consider correlation with anatomic/physiologic coronary artery testing.     *Coronary Artery  Agatston score   Score  risk Very low 1-99 Mildly increased 100-299 Moderately increased >300 Moderate to severely increased >800   Kwasi et al. JCCT 2016 (http://dx.[email or web address]/[email or web address]/[email or web address])   LAINEZ 10-Year CHD Risk with Coronary Artery Calcification can be calcuate using link below [email or web address] Vahe. JACC 2015 (http://dx.[email or web address]/[email or web address]/[email or web address] [email or web address])   Signed by: Guillermo Monaco 2/16/2024 10:57 AM Dictation workstation:   [id number]       NM Stress Test No results found.     Cardiac MRI No results found.     Left Heart Catheterization 8/2024  CONCLUSIONS:   1. Mild CAD in a right-dominant system.   2. Further management as per Dr. Robbins.    Cardiology last visit impression  8/29/2024  CARDIOLOGY UPDATE     Left heart catheterization  performed on 8/5/2024 showed mild CAD at mid LAD, 1st diagonal and OM1.  Given the patient's clinical assessment, there is no contraindication to list for kidney transplant from the cardiac standpoint.      Recommendations:  - HLD treatment with LDL goal <70  - Yearly stress test  - Cardiology follow-up every 2 years until transplanted     Connor Robbins MD    Pulmonary Function Test Results for orders placed or performed during the hospital encounter of 05/29/24   Complete Pulmonary Function Test (Spirometry/DLCO/Lung Volumes)   Result Value Ref Range    FVC - Predicted 5.05     FEV1 - Predicted 4.01     FVC - PRE 4.97     FEV1 - Pre 3.53        CT Abd/Pelvis 01/2023 - needs reviewed    Colonoscopy  No orders found for this or any previous visit.    Pap N/A    Mammogram N/A    Other:

## 2024-12-19 ENCOUNTER — TELEPHONE (OUTPATIENT)
Dept: TRANSPLANT | Facility: HOSPITAL | Age: 49
End: 2024-12-19
Payer: COMMERCIAL

## 2024-12-19 NOTE — TELEPHONE ENCOUNTER
PATIENT LETTER WAS MARKED AS RETURN TO SENDER   CALLED PATIENT TO INFORM ABOUT TXP DEPT MOVE   LEFT A VOICMAIL WITH ALL INFO

## 2024-12-24 ENCOUNTER — POST MORTEM DOCUMENTATION (OUTPATIENT)
Dept: TRANSPLANT | Facility: HOSPITAL | Age: 49
End: 2024-12-24
Payer: COMMERCIAL

## 2024-12-24 ENCOUNTER — TELEPHONE (OUTPATIENT)
Facility: HOSPITAL | Age: 49
End: 2024-12-24
Payer: COMMERCIAL

## 2024-12-24 NOTE — PROGRESS NOTES
Per family member, pt passed away recently.  Obituary located, with DOD 2024.  Pt marked  in Epic.

## (undated) DEVICE — CATHETER, ANGIO, IMPULSE, FL3.5, 5 FR X 100 CM

## (undated) DEVICE — GLIDESHEATH, SLENDER 6FR, 10CM, NITINOL KIT

## (undated) DEVICE — CATHETER, ANGIO, IMPULSE, FR4, 5 FR X 100 CM

## (undated) DEVICE — GUIDEWIRE, INQWIRE, 3MM J, .035 X 210CM, FIXED

## (undated) DEVICE — TR BAND, RADIAL COMPRESSION, STANDARD, 24CM

## (undated) DEVICE — PAD, ELECTRODE DEFIB PADPRO ADULT STRL W/ADAPTER

## (undated) DEVICE — SHEATH, GLIDESHEATH, SLENDER, 6FR 10CM